# Patient Record
Sex: FEMALE | Race: WHITE | Employment: UNEMPLOYED | URBAN - METROPOLITAN AREA
[De-identification: names, ages, dates, MRNs, and addresses within clinical notes are randomized per-mention and may not be internally consistent; named-entity substitution may affect disease eponyms.]

---

## 2018-04-04 NOTE — PROGRESS NOTES
Ambulatory/Rehab Services H2 Model Falls Risk Assessment    Risk Factor Pts. ·   Confusion/Disorientation/Impulsivity  []    4 ·   Symptomatic Depression  []   2 ·   Altered Elimination  []   1 ·   Dizziness/Vertigo  []   1 ·   Gender (Male)  []   1 ·   Any administered antiepileptics (anticonvulsants):  []   2 ·   Any administered benzodiazepines:  []   1 ·   Visual Impairment (specify):  []   1 ·   Portable Oxygen Use  []   1 ·   Orthostatic ? BP  []   1 ·   History of Recent Falls (within 3 mos.)  []   5     Ability to Rise from Chair (choose one) Pts. ·   Ability to rise in a single movement  [x]   0 ·   Pushes up, successful in one attempt  []   1 ·   Multiple attempts, but successful  []   3 ·   Unable to rise without assistance  []   4   Total: (5 or greater = High Risk) 0     Falls Prevention Plan:   []                Physical Limitations to Exercise (specify):   []                Mobility Assistance Device (type):   []                Exercise/Equipment Adaptation (specify):    ©2010 [facility] [address] Patent #[id number].  Federal Law prohibits the replication, distribution or use without written permission from [facility] [employer]

## 2018-04-04 NOTE — THERAPY EVALUATION
Anthony Lopezco  : 1965  Primary: Corinne Rye Rule/[facility]  Secondary:  [address]  at [place] [id number] [facility], [address] [place] [address],[address], [facility] [address]  [phone]   [phone]          OUTPATIENT PHYSICAL THERAPY:Initial Assessment 2018    ICD-10: Treatment Diagnosis: low back pain M54.5  Precautions/Allergies:   Erythromycin; Kenalog [triamcinolone acetonide]; Lisinopril; and Prednisone   Fall Risk Score: 0 (? 5 = High Risk)  MD Orders: eval and treat MEDICAL/REFERRING DIAGNOSIS:  Chronic left-sided low back pain without sciatica [M54.5, G89.29]   DATE OF ONSET:  with onset in 2017  REFERRING PHYSICIAN: Anni Eisenberg [address] TBD     INITIAL ASSESSMENT:  Ms. Lizabeth Su presents with hypermobile 6th rib, tender and hypertonic subscap, intercostal muscles and thoracic paraspinals, tenderness and step off at costovertebral joint T5, rounded shoulders that limits ability to perform household duties. Pt would benefit from skilled therapy to address these deficits for return to previous level of function. PROBLEM LIST (Impacting functional limitations):  1. Decreased Strength  2. Decreased ADL/Functional Activities  3. Decreased Transfer Abilities  4. Decreased Ambulation Ability/Technique  5. Increased Pain  6. Decreased Flexibility/Joint Mobility  7. Decreased [place] with Home Exercise Program INTERVENTIONS PLANNED:  1. Cold  2. Electrical Stimulation  3. Heat  4. Home Exercise Program (HEP)  5. Manual Therapy  6. Range of Motion (ROM)  7. Therapeutic Exercise/Strengthening   TREATMENT PLAN:  Effective Dates: 2018 TO 2018 (30 days). Frequency/Duration: 2 times a week for 30 Days  GOALS: (Goals have been discussed and agreed upon with patient.)  Short-Term Functional Goals: Time Frame: 2 weeks  1. Pt will be I with HEP to allow for continued progress with symptom management. Discharge Goals: Time Frame: 4 weeks  1.  Pt will improve JULISSA score to 2 to reflect an improvement in function. 2. Pt will improve c/o pain to 2/10 to allow for improved tolerance for housework. 3. Pt will improve middle trap strength to 5-/5. Rehabilitation Potential For Stated Goals: Good  Regarding Timoteo Nice's therapy, I certify that the treatment plan above will be carried out by a therapist or under their direction. Thank you for this referral,  Chirag Alcantar PT     Referring Physician Signature: Aury Jose*              Date                    The information in this section was collected on 04/05/18 (except where otherwise noted). HISTORY:   History of Present Injury/Illness (Reason for Referral):  Pt is a 47 y.o. Female presenting to PT with chronic left sided low back pain. Pain began a couple years ago and was doing a triangle pose in yoga and felt a pop in ribs. Pain began in the front of her ribs and wrapped around to the back. Over time the pain worsened and saw her doctor. Was given exercises and was able to improve the pain. Became sick last year and the pain began again. Cannot twist, lift with left arm, vacuuming, power walking. Has had to avoid painful motions. Xray was unremarkable. Past Medical History/Comorbidities:   Ms. Colonel Alonso  has a past medical history of Arthritis; Endocrine disease; Hashimoto's disease; and White coat hypertension. She also has no past medical history of Gastrointestinal disorder. Ms. Colonel lAonso  has a past surgical history that includes hx appendectomy and hx gyn.   Social History/Living Environment:     unknown  Prior Level of Function/Work/Activity:  Enjoys exercise and yoga  none  Current Medications:       Current Outpatient Prescriptions:     BYSTOLIC 10 mg tablet, TAKE ONE TABLET BY MOUTH EVERY DAY, Disp: 30 Tab, Rfl: 11    clotrimazole-betamethasone (LOTRISONE) topical cream, Apply  to affected area two (2) times a day., Disp: 60 g, Rfl: 0    LORazepam (ATIVAN) 0.5 mg tablet, Take 1 Tab by mouth every four (4) hours as needed for Anxiety. Max Daily Amount: 3 mg., Disp: 45 Tab, Rfl: 0    cholecalciferol, VITAMIN D3, (VITAMIN D3) 5,000 unit tab tablet, Take  by mouth daily. , Disp: , Rfl:     ANASTASIYA THYROID 60 mg tablet, , Disp: , Rfl:    Date Last Reviewed:  4/5/2018    Number of Personal Factors/Comorbidities that affect the Plan of Care: 1-2: MODERATE COMPLEXITY   EXAMINATION:   Observation/Orthostatic Postural Assessment:          Rounded shoulders  Palpation:          hypermobile 6th rib, tender and hypertonic subscap, intercostal muscles and thoracic paraspinals, tenderness and step off at costovertebral joint T5  ROM:            LUMBAR SPINE    AROM    Flexion   Extension  Right Rotation  Left Rotation  Right Sidebend  Left Sidebend 100 %  100 %  70 %  70 %  80 % stretching  80 %   Hip- WNL     Strength:          Middle trap- 4/5     Body Structures Involved:  1. Joints  2. Muscles Body Functions Affected:  1. Sensory/Pain  2. Movement Related Activities and Participation Affected:  1. General Tasks and Demands   Number of elements (examined above) that affect the Plan of Care: 3: MODERATE COMPLEXITY   CLINICAL PRESENTATION:   Presentation: Evolving clinical presentation with changing clinical characteristics: MODERATE COMPLEXITY   CLINICAL DECISION MAKING:   Outcome Measure: Tool Used: Modified Oswestry Low Back Pain Questionnaire  Score:  Initial: 5/50  Most Recent: X/50 (Date: -- )   Interpretation of Score: Each section is scored on a 0-5 scale, 5 representing the greatest disability. The scores of each section are added together for a total score of 50. Score 0 1-10 11-20 21-30 31-40 41-49 50   Modifier CH CI CJ CK CL CM CN     Medical Necessity:   · Patient is expected to demonstrate progress in strength and range of motion to return to previous level of function.   Reason for Services/Other Comments:  · Patient continues to require present interventions due to patient's inability to vacuum. Use of outcome tool(s) and clinical judgement create a POC that gives a: Questionable prediction of patient's progress: MODERATE COMPLEXITY            TREATMENT:   (In addition to Assessment/Re-Assessment sessions the following treatments were rendered)  Pre-treatment Symptoms/Complaints:  Chronic left sided LBP  Pain: Initial:   Pain Intensity 1:  (1-4/10)  Post Session:  4/10     THERAPEUTIC EXERCISE: (14 minutes):  Exercises per grid below to improve mobility and strength. Required moderate visual, verbal and manual cues to promote proper body alignment, promote proper body posture and promote proper body mechanics. Progressed resistance, range, repetitions and complexity of movement as indicated. Date:  04-04-18 Date:   Date:     Activity/Exercise Parameters Parameters Parameters   row 20 reps green      Pull down 20 reps [employer] [facility]  Treatment/Session Assessment:    · Response to Treatment:  Pt would benefit from skilled therapy to address the aforementioned deficits that limit functional ability to return to previous level of function. .  · Compliance with Program/Exercises: Will assess as treatment progresses. · Recommendations/Intent for next treatment session: \"Next visit will focus on advancements to more challenging activities\".   Total Treatment Duration:  PT Patient Time In/Time Out  Time In: 1340  Time Out: 2500 [facility]

## 2018-04-05 ENCOUNTER — HOSPITAL ENCOUNTER (OUTPATIENT)
Dept: PHYSICAL THERAPY | Age: 53
Discharge: HOME | End: 2018-04-05
Attending: FAMILY MEDICINE
Payer: COMMERCIAL

## 2018-04-05 DIAGNOSIS — G89.29 CHRONIC LEFT-SIDED LOW BACK PAIN WITHOUT SCIATICA: ICD-10-CM

## 2018-04-05 DIAGNOSIS — M54.50 CHRONIC LEFT-SIDED LOW BACK PAIN WITHOUT SCIATICA: ICD-10-CM

## 2018-04-05 PROCEDURE — 97110 THERAPEUTIC EXERCISES: CPT

## 2018-04-05 PROCEDURE — 97162 PT EVAL MOD COMPLEX 30 MIN: CPT

## 2018-04-11 ENCOUNTER — HOSPITAL ENCOUNTER (OUTPATIENT)
Dept: PHYSICAL THERAPY | Age: 53
Discharge: HOME | End: 2018-04-11
Attending: FAMILY MEDICINE
Payer: COMMERCIAL

## 2018-04-11 PROCEDURE — 97140 MANUAL THERAPY 1/> REGIONS: CPT

## 2018-04-11 PROCEDURE — 97110 THERAPEUTIC EXERCISES: CPT

## 2018-04-11 NOTE — PROGRESS NOTES
Aris Dodd  : 1965  Primary: Bessy Nogueira/[facility]  Secondary:  [address]  at [facility]  [address], [place] [address]  [phone]   [phone]          OUTPATIENT PHYSICAL THERAPY:Daily Note 2018    ICD-10: Treatment Diagnosis: low back pain M54.5  Precautions/Allergies:   Erythromycin; Kenalog [triamcinolone acetonide]; Lisinopril; and Prednisone   Fall Risk Score: 0 (? 5 = High Risk)  MD Orders: eval and treat MEDICAL/REFERRING DIAGNOSIS:  Low back pain [M54.5]   DATE OF ONSET:  with onset in 2017  REFERRING PHYSICIAN: Anin Eisenberg. [address] TBD     INITIAL ASSESSMENT:  Ms. Sowmya Panda presents with hypermobile 6th rib, tender and hypertonic subscap, intercostal muscles and thoracic paraspinals, tenderness and step off at costovertebral joint T5, rounded shoulders that limits ability to perform household duties. Pt would benefit from skilled therapy to address these deficits for return to previous level of function. PROBLEM LIST (Impacting functional limitations):  1. Decreased Strength  2. Decreased ADL/Functional Activities  3. Decreased Transfer Abilities  4. Decreased Ambulation Ability/Technique  5. Increased Pain  6. Decreased Flexibility/Joint Mobility  7. Decreased [place] with Home Exercise Program INTERVENTIONS PLANNED:  1. Cold  2. Electrical Stimulation  3. Heat  4. Home Exercise Program (HEP)  5. Manual Therapy  6. Range of Motion (ROM)  7. Therapeutic Exercise/Strengthening   TREATMENT PLAN:  Effective Dates: 2018 TO 2018 (30 days). Frequency/Duration: 2 times a week for 30 Days  GOALS: (Goals have been discussed and agreed upon with patient.)  Short-Term Functional Goals: Time Frame: 2 weeks  1. Pt will be I with HEP to allow for continued progress with symptom management. Discharge Goals: Time Frame: 4 weeks  1.  Pt will improve JULISSA score to 2 to reflect an improvement in function. 2. Pt will improve c/o pain to 2/10 to allow for improved tolerance for housework. 3. Pt will improve middle trap strength to 5-/5. Rehabilitation Potential For Stated Goals: Good  Regarding Susan Nice's therapy, I certify that the treatment plan above will be carried out by a therapist or under their direction. Thank you for this referral,  Miroslava Lopez PTA     Referring Physician Signature: Jeremiah Chilel*              Date                    The information in this section was collected on 04/05/18 (except where otherwise noted). HISTORY:   History of Present Injury/Illness (Reason for Referral):  Pt is a 47 y.o. Female presenting to PT with chronic left sided low back pain. Pain began a couple years ago and was doing a triangle pose in yoga and felt a pop in ribs. Pain began in the front of her ribs and wrapped around to the back. Over time the pain worsened and saw her doctor. Was given exercises and was able to improve the pain. Became sick last year and the pain began again. Cannot twist, lift with left arm, vacuuming, power walking. Has had to avoid painful motions. Xray was unremarkable. Past Medical History/Comorbidities:   Ms. Vish Bullock  has a past medical history of Arthritis; Endocrine disease; Hashimoto's disease; and White coat hypertension. She also has no past medical history of Gastrointestinal disorder. Ms. Vish Bullock  has a past surgical history that includes hx appendectomy and hx gyn.   Social History/Living Environment:     unknown  Prior Level of Function/Work/Activity:  Enjoys exercise and yoga  none  Current Medications:       Current Outpatient Prescriptions:     BYSTOLIC 10 mg tablet, TAKE ONE TABLET BY MOUTH EVERY DAY, Disp: 30 Tab, Rfl: 11    clotrimazole-betamethasone (LOTRISONE) topical cream, Apply  to affected area two (2) times a day., Disp: 60 g, Rfl: 0    LORazepam (ATIVAN) 0.5 mg tablet, Take 1 Tab by mouth every four (4) hours as needed for Anxiety. Max Daily Amount: 3 mg., Disp: 45 Tab, Rfl: 0    cholecalciferol, VITAMIN D3, (VITAMIN D3) 5,000 unit tab tablet, Take  by mouth daily. , Disp: , Rfl:     ANASTASIYA THYROID 60 mg tablet, , Disp: , Rfl:    Date Last Reviewed:  4/11/2018    Number of Personal Factors/Comorbidities that affect the Plan of Care: 1-2: MODERATE COMPLEXITY   EXAMINATION:   Observation/Orthostatic Postural Assessment:          Rounded shoulders  Palpation:          hypermobile 6th rib, tender and hypertonic subscap, intercostal muscles and thoracic paraspinals, tenderness and step off at costovertebral joint T5  ROM:            LUMBAR SPINE    AROM    Flexion   Extension  Right Rotation  Left Rotation  Right Sidebend  Left Sidebend 100 %  100 %  70 %  70 %  80 % stretching  80 %   Hip- WNL     Strength:          Middle trap- 4/5     Body Structures Involved:  1. Joints  2. Muscles Body Functions Affected:  1. Sensory/Pain  2. Movement Related Activities and Participation Affected:  1. General Tasks and Demands   Number of elements (examined above) that affect the Plan of Care: 3: MODERATE COMPLEXITY   CLINICAL PRESENTATION:   Presentation: Evolving clinical presentation with changing clinical characteristics: MODERATE COMPLEXITY   CLINICAL DECISION MAKING:   Outcome Measure: Tool Used: Modified Oswestry Low Back Pain Questionnaire  Score:  Initial: 5/50  Most Recent: X/50 (Date: -- )   Interpretation of Score: Each section is scored on a 0-5 scale, 5 representing the greatest disability. The scores of each section are added together for a total score of 50. Score 0 1-10 11-20 21-30 31-40 41-49 50   Modifier CH CI CJ CK CL CM CN     Medical Necessity:   · Patient is expected to demonstrate progress in strength and range of motion to return to previous level of function. Reason for Services/Other Comments:  · Patient continues to require present interventions due to patient's inability to vacuum.    Use of outcome tool(s) and clinical judgement create a POC that gives a: Questionable prediction of patient's progress: MODERATE COMPLEXITY            TREATMENT:   (In addition to Assessment/Re-Assessment sessions the following treatments were rendered)  Pre-treatment Symptoms/Complaints:  Chronic left sided LBP-Rib region  Pain: Initial:     2-10 I picked up a box and brought it to good will. Post Session:  4/10     THERAPEUTIC EXERCISE: (15 minutes):  Exercises per grid below to improve mobility and strength. Required minimal visual, verbal and manual cues to promote proper body alignment, promote proper body posture and promote proper body mechanics. Progressed resistance, range, repetitions and complexity of movement as indicated. MANUAL THERAPY: (30 minutes): Joint mobilization and Soft tissue mobilization was utilized and necessary because of the patient's restricted joint motion, loss of articular motion and restricted motion of soft tissue. MODALITIES: (10 minutes):      *  Cold Pack Therapy in order to provide analgesia. Date:  04-04-18 Date:  4-11-18 Date:     Activity/Exercise Parameters Parameters Parameters   row 20 reps green  20 reps Blue    Pull down 20 reps green 20 reps Blue    UBE  5 min fwd bwds 2.5    Push Ups  15 reps    Lower trunk rotation  X 10 reps pause 5 sec    Bridging  5 sec hold 10 's    Tband D1   Blue x 15 reps each side                   [employer] Portal  Treatment/Session Assessment:    · Response to Treatment:   Patient was able to complete tx well. Tenderness mid back/ribcage/scapular region on Left side. Continue plan of care. · Compliance with Program/Exercises: .  · Recommendations/Intent for next treatment session: \"Next visit will focus on advancements to more challenging activities\".   Total Treatment Duration:  PT Patient Time In/Time Out  Time In: 1400  Time Out: [address], PTA

## 2018-04-13 ENCOUNTER — HOSPITAL ENCOUNTER (OUTPATIENT)
Dept: PHYSICAL THERAPY | Age: 53
Discharge: HOME | End: 2018-04-13
Attending: FAMILY MEDICINE
Payer: COMMERCIAL

## 2018-04-13 PROCEDURE — 97140 MANUAL THERAPY 1/> REGIONS: CPT

## 2018-04-13 PROCEDURE — 97110 THERAPEUTIC EXERCISES: CPT

## 2018-04-13 NOTE — PROGRESS NOTES
Driss Jordan  : 1965  Primary: George Nogueira/[facility]  Secondary:  [address]  at [facility]  [address], [address], [address] [facility]  [phone]   [phone]          OUTPATIENT PHYSICAL THERAPY:Daily Note 2018    ICD-10: Treatment Diagnosis: low back pain M54.5  Precautions/Allergies:   Erythromycin; Kenalog [triamcinolone acetonide]; Lisinopril; and Prednisone   Fall Risk Score: 0 (? 5 = High Risk)  MD Orders: eval and treat MEDICAL/REFERRING DIAGNOSIS:  Low back pain [M54.5]   DATE OF ONSET:  with onset in 2017  REFERRING PHYSICIAN: Anni Eisenberg [address] TBD     INITIAL ASSESSMENT:  Ms. Ting Leong presents with hypermobile 6th rib, tender and hypertonic subscap, intercostal muscles and thoracic paraspinals, tenderness and step off at costovertebral joint T5, rounded shoulders that limits ability to perform household duties. Pt would benefit from skilled therapy to address these deficits for return to previous level of function. PROBLEM LIST (Impacting functional limitations):  1. Decreased Strength  2. Decreased ADL/Functional Activities  3. Decreased Transfer Abilities  4. Decreased Ambulation Ability/Technique  5. Increased Pain  6. Decreased Flexibility/Joint Mobility  7. Decreased [place] with Home Exercise Program INTERVENTIONS PLANNED:  1. Cold  2. Electrical Stimulation  3. Heat  4. Home Exercise Program (HEP)  5. Manual Therapy  6. Range of Motion (ROM)  7. Therapeutic Exercise/Strengthening   TREATMENT PLAN:  Effective Dates: 2018 TO 2018 (30 days). Frequency/Duration: 2 times a week for 30 Days  GOALS: (Goals have been discussed and agreed upon with patient.)  Short-Term Functional Goals: Time Frame: 2 weeks  1. Pt will be I with HEP to allow for continued progress with symptom management. Discharge Goals: Time Frame: 4 weeks  1.  Pt will improve JULISSA score to 2 to reflect an improvement in function. 2. Pt will improve c/o pain to 2/10 to allow for improved tolerance for housework. 3. Pt will improve middle trap strength to 5-/5. Rehabilitation Potential For Stated Goals: Good  Regarding Timoteo Ugartet's therapy, I certify that the treatment plan above will be carried out by a therapist or under their direction. Thank you for this referral,  Chirag Alcantar, PT     Referring Physician Signature: Aury Garcia*              Date                    The information in this section was collected on 04/05/18 (except where otherwise noted). HISTORY:   History of Present Injury/Illness (Reason for Referral):  Pt is a 47 y.o. Female presenting to PT with chronic left sided low back pain. Pain began a couple years ago and was doing a triangle pose in yoga and felt a pop in ribs. Pain began in the front of her ribs and wrapped around to the back. Over time the pain worsened and saw her doctor. Was given exercises and was able to improve the pain. Became sick last year and the pain began again. Cannot twist, lift with left arm, vacuuming, power walking. Has had to avoid painful motions. Xray was unremarkable. Past Medical History/Comorbidities:   Ms. Colonel Alonso  has a past medical history of Arthritis; Endocrine disease; Hashimoto's disease; and White coat hypertension. She also has no past medical history of Gastrointestinal disorder. Ms. Colonel Alonso  has a past surgical history that includes hx appendectomy and hx gyn.   Social History/Living Environment:     unknown  Prior Level of Function/Work/Activity:  Enjoys exercise and yoga  none  Current Medications:       Current Outpatient Prescriptions:     BYSTOLIC 10 mg tablet, TAKE ONE TABLET BY MOUTH EVERY DAY, Disp: 30 Tab, Rfl: 11    clotrimazole-betamethasone (LOTRISONE) topical cream, Apply  to affected area two (2) times a day., Disp: 60 g, Rfl: 0    LORazepam (ATIVAN) 0.5 mg tablet, Take 1 Tab by mouth every four (4) hours as needed for Anxiety. Max Daily Amount: 3 mg., Disp: 45 Tab, Rfl: 0    cholecalciferol, VITAMIN D3, (VITAMIN D3) 5,000 unit tab tablet, Take  by mouth daily. , Disp: , Rfl:     ANASTASIYA THYROID 60 mg tablet, , Disp: , Rfl:    Date Last Reviewed:  4/13/2018    Number of Personal Factors/Comorbidities that affect the Plan of Care: 1-2: MODERATE COMPLEXITY   EXAMINATION:   Observation/Orthostatic Postural Assessment:          Rounded shoulders  Palpation:          hypermobile 6th rib, tender and hypertonic subscap, intercostal muscles and thoracic paraspinals, tenderness and step off at costovertebral joint T5  ROM:            LUMBAR SPINE    AROM    Flexion   Extension  Right Rotation  Left Rotation  Right Sidebend  Left Sidebend 100 %  100 %  70 %  70 %  80 % stretching  80 %   Hip- WNL     Strength:          Middle trap- 4/5     Body Structures Involved:  1. Joints  2. Muscles Body Functions Affected:  1. Sensory/Pain  2. Movement Related Activities and Participation Affected:  1. General Tasks and Demands   Number of elements (examined above) that affect the Plan of Care: 3: MODERATE COMPLEXITY   CLINICAL PRESENTATION:   Presentation: Evolving clinical presentation with changing clinical characteristics: MODERATE COMPLEXITY   CLINICAL DECISION MAKING:   Outcome Measure: Tool Used: Modified Oswestry Low Back Pain Questionnaire  Score:  Initial: 5/50  Most Recent: X/50 (Date: -- )   Interpretation of Score: Each section is scored on a 0-5 scale, 5 representing the greatest disability. The scores of each section are added together for a total score of 50. Score 0 1-10 11-20 21-30 31-40 41-49 50   Modifier CH CI CJ CK CL CM CN     Medical Necessity:   · Patient is expected to demonstrate progress in strength and range of motion to return to previous level of function. Reason for Services/Other Comments:  · Patient continues to require present interventions due to patient's inability to vacuum.    Use of outcome tool(s) and clinical judgement create a POC that gives a: Questionable prediction of patient's progress: MODERATE COMPLEXITY            TREATMENT:   (In addition to Assessment/Re-Assessment sessions the following treatments were rendered)  Pre-treatment Symptoms/Complaints:  Chronic left sided LBP-Rib region- 4/13/2018 Pt reports that the intensity and frequency of her pain has improved. Pain: Initial:    4/10 Post Session:  4/10     THERAPEUTIC EXERCISE: (15 minutes):  Exercises per grid below to improve mobility and strength. Required minimal visual, verbal and manual cues to promote proper body alignment, promote proper body posture and promote proper body mechanics. Progressed resistance, range, repetitions and complexity of movement as indicated. MANUAL THERAPY: (30 minutes): Joint mobilization and Soft tissue mobilization was utilized and necessary because of the patient's restricted joint motion, loss of articular motion and restricted motion of soft tissue. MODALITIES: (10 minutes):      *  Cold Pack Therapy in order to provide analgesia. Date:  04-04-18 Date:  4-11-18 Date:  04-13-18   Activity/Exercise Parameters Parameters Parameters   row 20 reps green  20 reps Blue    Pull down 20 reps green 20 reps Blue    UBE  5 min fwd bwds 2.5 5 min fwd level 3.0   Push Ups  15 reps On wall x 20 reps   Lower trunk rotation  X 10 reps pause 5 sec 20 reps   Bridging  5 sec hold 10 's 20 reps   Tband D1   Blue x 15 reps each side Blue x 20 reps each   Horizontal ABD   Red x 20 reps            Med[facility] Portal  Treatment/Session Assessment:    · Response to Treatment:   Patient is progressing well with lumbar and thoracic stability. · Compliance with Program/Exercises: .  · Recommendations/Intent for next treatment session: \"Next visit will focus on advancements to more challenging activities\".   Total Treatment Duration:  PT Patient Time In/Time Out  Time In: 1033  Time Out: [address]

## 2018-04-17 ENCOUNTER — HOSPITAL ENCOUNTER (OUTPATIENT)
Dept: PHYSICAL THERAPY | Age: 53
Discharge: HOME | End: 2018-04-17
Attending: FAMILY MEDICINE
Payer: COMMERCIAL

## 2018-04-17 PROCEDURE — 97110 THERAPEUTIC EXERCISES: CPT

## 2018-04-17 PROCEDURE — 97140 MANUAL THERAPY 1/> REGIONS: CPT

## 2018-04-17 NOTE — PROGRESS NOTES
Lewis Mireya  : 1965  Primary: Angelkathya Nogueira/[facility]  Secondary:  [address] at [place] [id number] [facility], [address] [place] [address],[address], [address]  [phone]   [phone]          OUTPATIENT PHYSICAL THERAPY:Daily Note 2018    ICD-10: Treatment Diagnosis: low back pain M54.5  Precautions/Allergies:   Erythromycin; Kenalog [triamcinolone acetonide]; Lisinopril; and Prednisone   Fall Risk Score: 0 (? 5 = High Risk)  MD Orders: eval and treat MEDICAL/REFERRING DIAGNOSIS:  Low back pain [M54.5]   DATE OF ONSET:  with onset in 2017  REFERRING PHYSICIAN: Anni Eisenberg. [address] TBD     INITIAL ASSESSMENT:  Ms. Falcon  presents with hypermobile 6th rib, tender and hypertonic subscap, intercostal muscles and thoracic paraspinals, tenderness and step off at costovertebral joint T5, rounded shoulders that limits ability to perform household duties. Pt would benefit from skilled therapy to address these deficits for return to previous level of function. PROBLEM LIST (Impacting functional limitations):  1. Decreased Strength  2. Decreased ADL/Functional Activities  3. Decreased Transfer Abilities  4. Decreased Ambulation Ability/Technique  5. Increased Pain  6. Decreased Flexibility/Joint Mobility  7. Decreased [place] with Home Exercise Program INTERVENTIONS PLANNED:  1. Cold  2. Electrical Stimulation  3. Heat  4. Home Exercise Program (HEP)  5. Manual Therapy  6. Range of Motion (ROM)  7. Therapeutic Exercise/Strengthening   TREATMENT PLAN:  Effective Dates: 2018 TO 2018 (30 days). Frequency/Duration: 2 times a week for 30 Days  GOALS: (Goals have been discussed and agreed upon with patient.)  Short-Term Functional Goals: Time Frame: 2 weeks  1. Pt will be I with HEP to allow for continued progress with symptom management. Discharge Goals: Time Frame: 4 weeks  1.  Pt will improve JULISSA score to 2 to reflect an improvement in function. 2. Pt will improve c/o pain to 2/10 to allow for improved tolerance for housework. 3. Pt will improve middle trap strength to 5-/5. Rehabilitation Potential For Stated Goals: Good  Regarding Maurizio Niec's therapy, I certify that the treatment plan above will be carried out by a therapist or under their direction. Thank you for this referral,  Tino Knutson PTA     Referring Physician Signature: Paige Brian*              Date                    The information in this section was collected on 04/05/18 (except where otherwise noted). HISTORY:   History of Present Injury/Illness (Reason for Referral):  Pt is a 47 y.o. Female presenting to PT with chronic left sided low back pain. Pain began a couple years ago and was doing a triangle pose in yoga and felt a pop in ribs. Pain began in the front of her ribs and wrapped around to the back. Over time the pain worsened and saw her doctor. Was given exercises and was able to improve the pain. Became sick last year and the pain began again. Cannot twist, lift with left arm, vacuuming, power walking. Has had to avoid painful motions. Xray was unremarkable. Past Medical History/Comorbidities:   Ms. Shawna Castaneda  has a past medical history of Arthritis; Endocrine disease; Hashimoto's disease; and White coat hypertension. She also has no past medical history of Gastrointestinal disorder. Ms. Shawna Castaneda  has a past surgical history that includes hx appendectomy and hx gyn.   Social History/Living Environment:     unknown  Prior Level of Function/Work/Activity:  Enjoys exercise and yoga  none  Current Medications:       Current Outpatient Prescriptions:     BYSTOLIC 10 mg tablet, TAKE ONE TABLET BY MOUTH EVERY DAY, Disp: 30 Tab, Rfl: 11    clotrimazole-betamethasone (LOTRISONE) topical cream, Apply  to affected area two (2) times a day., Disp: 60 g, Rfl: 0    LORazepam (ATIVAN) 0.5 mg tablet, Take 1 Tab by mouth every four (4) hours as needed for Anxiety. Max Daily Amount: 3 mg., Disp: 45 Tab, Rfl: 0    cholecalciferol, VITAMIN D3, (VITAMIN D3) 5,000 unit tab tablet, Take  by mouth daily. , Disp: , Rfl:     ANASTASIYA THYROID 60 mg tablet, , Disp: , Rfl:    Date Last Reviewed:  4/17/2018    Number of Personal Factors/Comorbidities that affect the Plan of Care: 1-2: MODERATE COMPLEXITY   EXAMINATION:   Observation/Orthostatic Postural Assessment:          Rounded shoulders  Palpation:          hypermobile 6th rib, tender and hypertonic subscap, intercostal muscles and thoracic paraspinals, tenderness and step off at costovertebral joint T5  ROM:            LUMBAR SPINE    AROM    Flexion   Extension  Right Rotation  Left Rotation  Right Sidebend  Left Sidebend 100 %  100 %  70 %  70 %  80 % stretching  80 %   Hip- WNL     Strength:          Middle trap- 4/5     Body Structures Involved:  1. Joints  2. Muscles Body Functions Affected:  1. Sensory/Pain  2. Movement Related Activities and Participation Affected:  1. General Tasks and Demands   Number of elements (examined above) that affect the Plan of Care: 3: MODERATE COMPLEXITY   CLINICAL PRESENTATION:   Presentation: Evolving clinical presentation with changing clinical characteristics: MODERATE COMPLEXITY   CLINICAL DECISION MAKING:   Outcome Measure: Tool Used: Modified Oswestry Low Back Pain Questionnaire  Score:  Initial: 5/50  Most Recent: X/50 (Date: -- )   Interpretation of Score: Each section is scored on a 0-5 scale, 5 representing the greatest disability. The scores of each section are added together for a total score of 50. Score 0 1-10 11-20 21-30 31-40 41-49 50   Modifier CH CI CJ CK CL CM CN     Medical Necessity:   · Patient is expected to demonstrate progress in strength and range of motion to return to previous level of function. Reason for Services/Other Comments:  · Patient continues to require present interventions due to patient's inability to vacuum.    Use of outcome tool(s) and clinical judgement create a POC that gives a: Questionable prediction of patient's progress: MODERATE COMPLEXITY            TREATMENT:   (In addition to Assessment/Re-Assessment sessions the following treatments were rendered)  Pre-treatment Symptoms/Complaints:  4/17/2018 \"it is getting some better\"  Pain: Initial:     1-2/10 Post Session:  1/10     THERAPEUTIC EXERCISE: (15 minutes):  Exercises per grid below to improve mobility and strength. Required minimal visual, verbal and manual cues to promote proper body alignment, promote proper body posture and promote proper body mechanics. Progressed resistance, range, repetitions and complexity of movement as indicated. MANUAL THERAPY: (30 minutes): Joint mobilization and Soft tissue mobilization was utilized and necessary because of the patient's restricted joint motion, loss of articular motion and restricted motion of soft tissue. MODALITIES: (10 minutes):      *  Cold Pack Therapy in order to provide analgesia. Date:  4-11-18 Date:  04-16-18   Activity/Exercise Parameters Parameters   row 20 reps Blue 20 reps Blue   Pull down 20 reps Blue 20 reps Blue   UBE 5 min fwd bwds 2.5 5 min fwd level 3.0   Push Ups 15 reps On wall x 20 reps   Lower trunk rotation X 10 reps pause 5 sec 20 reps   Bridging 5 sec hold 10 's 20 reps   Tband D1  Blue x 15 reps each side Blue x 20 reps each   Horizontal ABD  Red x 20 reps           MedBridge Portal  Treatment/Session Assessment:    · Response to Treatment:   Mid Scapular tightness, wraps around to ribcage on the Left side. More irritated today then normal, did yard work over the weekend. Continue plan of care. · Compliance with Program/Exercises: .  · Recommendations/Intent for next treatment session: \"Next visit will focus on advancements to more challenging activities\".   Total Treatment Duration:  PT Patient Time In/Time Out  Time In: 1115  Time Out: [address], [facility]

## 2018-04-19 ENCOUNTER — HOSPITAL ENCOUNTER (OUTPATIENT)
Dept: PHYSICAL THERAPY | Age: 53
Discharge: HOME | End: 2018-04-19
Attending: FAMILY MEDICINE
Payer: COMMERCIAL

## 2018-04-19 PROCEDURE — 97014 ELECTRIC STIMULATION THERAPY: CPT

## 2018-04-19 PROCEDURE — 97110 THERAPEUTIC EXERCISES: CPT

## 2018-04-19 PROCEDURE — 97140 MANUAL THERAPY 1/> REGIONS: CPT

## 2018-04-19 NOTE — PROGRESS NOTES
Kris Hall  : 1965  Primary: Azra Nogueira/[facility]  Secondary:  [address] at [place] [address] [facility], [address] [place] [address],[address], [address]  [phone]   [phone]          OUTPATIENT PHYSICAL THERAPY:Daily Note 2018    ICD-10: Treatment Diagnosis: low back pain M54.5  Precautions/Allergies:   Erythromycin; Kenalog [triamcinolone acetonide]; Lisinopril; and Prednisone   Fall Risk Score: 0 (? 5 = High Risk)  MD Orders: eval and treat MEDICAL/REFERRING DIAGNOSIS:  Low back pain [M54.5]   DATE OF ONSET:  with onset in 2017  REFERRING PHYSICIAN: Anni Eisenberg. [address] TBD     INITIAL ASSESSMENT:  Ms. Talya Reveles presents with hypermobile 6th rib, tender and hypertonic subscap, intercostal muscles and thoracic paraspinals, tenderness and step off at costovertebral joint T5, rounded shoulders that limits ability to perform household duties. Pt would benefit from skilled therapy to address these deficits for return to previous level of function. PROBLEM LIST (Impacting functional limitations):  1. Decreased Strength  2. Decreased ADL/Functional Activities  3. Decreased Transfer Abilities  4. Decreased Ambulation Ability/Technique  5. Increased Pain  6. Decreased Flexibility/Joint Mobility  7. Decreased [place] with Home Exercise Program INTERVENTIONS PLANNED:  1. Cold  2. Electrical Stimulation  3. Heat  4. Home Exercise Program (HEP)  5. Manual Therapy  6. Range of Motion (ROM)  7. Therapeutic Exercise/Strengthening   TREATMENT PLAN:  Effective Dates: 2018 TO 2018 (30 days). Frequency/Duration: 2 times a week for 30 Days  GOALS: (Goals have been discussed and agreed upon with patient.)  Short-Term Functional Goals: Time Frame: 2 weeks  1. Pt will be I with HEP to allow for continued progress with symptom management. Discharge Goals: Time Frame: 4 weeks  1.  Pt will improve JULISSA score to 2 to reflect an improvement in function. 2. Pt will improve c/o pain to 2/10 to allow for improved tolerance for housework. 3. Pt will improve middle trap strength to 5-/5. Rehabilitation Potential For Stated Goals: Good  Regarding Cindy Nice's therapy, I certify that the treatment plan above will be carried out by a therapist or under their direction. Thank you for this referral,  Chino Ca PTA     Referring Physician Signature: Genaro Castillo*              Date                    The information in this section was collected on 04/05/18 (except where otherwise noted). HISTORY:   History of Present Injury/Illness (Reason for Referral):  Pt is a 47 y.o. Female presenting to PT with chronic left sided low back pain. Pain began a couple years ago and was doing a triangle pose in yoga and felt a pop in ribs. Pain began in the front of her ribs and wrapped around to the back. Over time the pain worsened and saw her doctor. Was given exercises and was able to improve the pain. Became sick last year and the pain began again. Cannot twist, lift with left arm, vacuuming, power walking. Has had to avoid painful motions. Xray was unremarkable. Past Medical History/Comorbidities:   Ms. Melvin Sarkar  has a past medical history of Arthritis; Endocrine disease; Hashimoto's disease; and White coat hypertension. She also has no past medical history of Gastrointestinal disorder. Ms. Melvin Sarkar  has a past surgical history that includes hx appendectomy and hx gyn.   Social History/Living Environment:     unknown  Prior Level of Function/Work/Activity:  Enjoys exercise and yoga  none  Current Medications:       Current Outpatient Prescriptions:     BYSTOLIC 10 mg tablet, TAKE ONE TABLET BY MOUTH EVERY DAY, Disp: 30 Tab, Rfl: 11    clotrimazole-betamethasone (LOTRISONE) topical cream, Apply  to affected area two (2) times a day., Disp: 60 g, Rfl: 0    LORazepam (ATIVAN) 0.5 mg tablet, Take 1 Tab by mouth every four (4) hours as needed for Anxiety. Max Daily Amount: 3 mg., Disp: 45 Tab, Rfl: 0    cholecalciferol, VITAMIN D3, (VITAMIN D3) 5,000 unit tab tablet, Take  by mouth daily. , Disp: , Rfl:     ANASTASIYA THYROID 60 mg tablet, , Disp: , Rfl:    Date Last Reviewed:  4/19/2018    Number of Personal Factors/Comorbidities that affect the Plan of Care: 1-2: MODERATE COMPLEXITY   EXAMINATION:   Observation/Orthostatic Postural Assessment:          Rounded shoulders  Palpation:          hypermobile 6th rib, tender and hypertonic subscap, intercostal muscles and thoracic paraspinals, tenderness and step off at costovertebral joint T5  ROM:            LUMBAR SPINE    AROM    Flexion   Extension  Right Rotation  Left Rotation  Right Sidebend  Left Sidebend 100 %  100 %  70 %  70 %  80 % stretching  80 %   Hip- WNL     Strength:          Middle trap- 4/5     Body Structures Involved:  1. Joints  2. Muscles Body Functions Affected:  1. Sensory/Pain  2. Movement Related Activities and Participation Affected:  1. General Tasks and Demands   Number of elements (examined above) that affect the Plan of Care: 3: MODERATE COMPLEXITY   CLINICAL PRESENTATION:   Presentation: Evolving clinical presentation with changing clinical characteristics: MODERATE COMPLEXITY   CLINICAL DECISION MAKING:   Outcome Measure: Tool Used: Modified Oswestry Low Back Pain Questionnaire  Score:  Initial: 5/50  Most Recent: X/50 (Date: -- )   Interpretation of Score: Each section is scored on a 0-5 scale, 5 representing the greatest disability. The scores of each section are added together for a total score of 50. Score 0 1-10 11-20 21-30 31-40 41-49 50   Modifier CH CI CJ CK CL CM CN     Medical Necessity:   · Patient is expected to demonstrate progress in strength and range of motion to return to previous level of function. Reason for Services/Other Comments:  · Patient continues to require present interventions due to patient's inability to vacuum.    Use of outcome tool(s) and clinical judgement create a POC that gives a: Questionable prediction of patient's progress: MODERATE COMPLEXITY            TREATMENT:   (In addition to Assessment/Re-Assessment sessions the following treatments were rendered)  Pre-treatment Symptoms/Complaints:  4/19/2018 \"It is really hurting today, I think it is that one exercises I am doing at home\"  Pain: Initial:     3-5/10 Post Session:  2/10     THERAPEUTIC EXERCISE: (15 minutes):  Exercises per grid below to improve mobility and strength. Required minimal visual, verbal and manual cues to promote proper body alignment, promote proper body posture and promote proper body mechanics. Progressed resistance, range, repetitions and complexity of movement as indicated. MANUAL THERAPY: (15 minutes): Joint mobilization and Soft tissue mobilization was utilized and necessary because of the patient's restricted joint motion, loss of articular motion and restricted motion of soft tissue. MODALITIES: (15 minutes): *  Electrical Stimulation Therapy (15) was provided with intensity adjusted throughout treatment to patient tolerance. mid back Left side       *  Cold Pack Therapy in order to provide analgesia. Date:  4-11-18 Date:  04-19-18   Activity/Exercise Parameters Parameters   row 20 reps Blue 20 reps Blue   Pull down 20 reps Blue 20 reps Blue   UBE 5 min fwd bwds 2.5 5 min fwd level 3.0   Push Ups 15 reps On wall x 20 reps   Lower trunk rotation X 10 reps pause 5 sec 20 reps   Bridging 5 sec hold 10 's 20 reps   Tband D1  Blue x 15 reps each side Blue x 20 reps each   Horizontal ABD  Causes pain-           [facility] Portal  Treatment/Session Assessment:    · Response to Treatment:   Patient has been seen for 5 visits with minimal improvement. Still having symptoms left side Mid back that wraps around to rib cage. Increased pain today-  · Compliance with Program/Exercises: .  · Recommendations/Intent for next treatment session:  \"Next visit will focus on advancements to more challenging activities\".   Total Treatment Duration:  PT Patient Time In/Time Out  Time In: 1345  Time Out: [address], PTA

## 2018-04-26 ENCOUNTER — HOSPITAL ENCOUNTER (OUTPATIENT)
Dept: PHYSICAL THERAPY | Age: 53
Discharge: HOME | End: 2018-04-26
Attending: FAMILY MEDICINE
Payer: COMMERCIAL

## 2018-04-26 PROCEDURE — 97140 MANUAL THERAPY 1/> REGIONS: CPT

## 2018-04-26 PROCEDURE — 97014 ELECTRIC STIMULATION THERAPY: CPT

## 2018-04-26 NOTE — PROGRESS NOTES
Kevin Bragg  : 1965  Primary: Maninder Chadwick Jero/[facility]  Secondary:  [address] at [place] [address] [facility], [address] [place] [address],[address], [facility] [address]  [phone]   [phone]          OUTPATIENT PHYSICAL THERAPY:Daily Note 2018    ICD-10: Treatment Diagnosis: low back pain M54.5  Precautions/Allergies:   Erythromycin; Kenalog [triamcinolone acetonide]; Lisinopril; and Prednisone   Fall Risk Score: 0 (? 5 = High Risk)  MD Orders: eval and treat MEDICAL/REFERRING DIAGNOSIS:  Low back pain [M54.5]   DATE OF ONSET:  with onset in 2017  REFERRING PHYSICIAN: Anni Eisenberg. [address] TBD     INITIAL ASSESSMENT:  Ms. Tia Wilson presents with hypermobile 6th rib, tender and hypertonic subscap, intercostal muscles and thoracic paraspinals, tenderness and step off at costovertebral joint T5, rounded shoulders that limits ability to perform household duties. Pt would benefit from skilled therapy to address these deficits for return to previous level of function. PROBLEM LIST (Impacting functional limitations):  1. Decreased Strength  2. Decreased ADL/Functional Activities  3. Decreased Transfer Abilities  4. Decreased Ambulation Ability/Technique  5. Increased Pain  6. Decreased Flexibility/Joint Mobility  7. Decreased [place] with Home Exercise Program INTERVENTIONS PLANNED:  1. Cold  2. Electrical Stimulation  3. Heat  4. Home Exercise Program (HEP)  5. Manual Therapy  6. Range of Motion (ROM)  7. Therapeutic Exercise/Strengthening   TREATMENT PLAN:  Effective Dates: 2018 TO 2018 (30 days). Frequency/Duration: 2 times a week for 30 Days  GOALS: (Goals have been discussed and agreed upon with patient.)  Short-Term Functional Goals: Time Frame: 2 weeks  1. Pt will be I with HEP to allow for continued progress with symptom management. Discharge Goals: Time Frame: 4 weeks  1.  Pt will improve JULISSA score to 2 to reflect an improvement in function. 2. Pt will improve c/o pain to 2/10 to allow for improved tolerance for housework. 3. Pt will improve middle trap strength to 5-/5. Rehabilitation Potential For Stated Goals: Good  Regarding Timoteo Ugartet's therapy, I certify that the treatment plan above will be carried out by a therapist or under their direction. Thank you for this referral,  Bettyjane Sicard, PTA     Referring Physician Signature: Aury Garcia*              Date                    The information in this section was collected on 04/05/18 (except where otherwise noted). HISTORY:   History of Present Injury/Illness (Reason for Referral):  Pt is a 47 y.o. Female presenting to PT with chronic left sided low back pain. Pain began a couple years ago and was doing a triangle pose in yoga and felt a pop in ribs. Pain began in the front of her ribs and wrapped around to the back. Over time the pain worsened and saw her doctor. Was given exercises and was able to improve the pain. Became sick last year and the pain began again. Cannot twist, lift with left arm, vacuuming, power walking. Has had to avoid painful motions. Xray was unremarkable. Past Medical History/Comorbidities:   Ms. Colonel Alonso  has a past medical history of Arthritis; Endocrine disease; Hashimoto's disease; and White coat hypertension. She also has no past medical history of Gastrointestinal disorder. Ms. Colonel Alonso  has a past surgical history that includes hx appendectomy and hx gyn.   Social History/Living Environment:     unknown  Prior Level of Function/Work/Activity:  Enjoys exercise and yoga  none  Current Medications:       Current Outpatient Prescriptions:     BYSTOLIC 10 mg tablet, TAKE ONE TABLET BY MOUTH EVERY DAY, Disp: 30 Tab, Rfl: 11    clotrimazole-betamethasone (LOTRISONE) topical cream, Apply  to affected area two (2) times a day., Disp: 60 g, Rfl: 0    LORazepam (ATIVAN) 0.5 mg tablet, Take 1 Tab by mouth every four (4) hours as needed for Anxiety. Max Daily Amount: 3 mg., Disp: 45 Tab, Rfl: 0    cholecalciferol, VITAMIN D3, (VITAMIN D3) 5,000 unit tab tablet, Take  by mouth daily. , Disp: , Rfl:     ANASTASIYA THYROID 60 mg tablet, , Disp: , Rfl:    Date Last Reviewed:  4/26/2018    Number of Personal Factors/Comorbidities that affect the Plan of Care: 1-2: MODERATE COMPLEXITY   EXAMINATION:   Observation/Orthostatic Postural Assessment:          Rounded shoulders  Palpation:          hypermobile 6th rib, tender and hypertonic subscap, intercostal muscles and thoracic paraspinals, tenderness and step off at costovertebral joint T5  ROM:            LUMBAR SPINE    AROM    Flexion   Extension  Right Rotation  Left Rotation  Right Sidebend  Left Sidebend 100 %  100 %  70 %  70 %  80 % stretching  80 %   Hip- WNL     Strength:          Middle trap- 4/5     Body Structures Involved:  1. Joints  2. Muscles Body Functions Affected:  1. Sensory/Pain  2. Movement Related Activities and Participation Affected:  1. General Tasks and Demands   Number of elements (examined above) that affect the Plan of Care: 3: MODERATE COMPLEXITY   CLINICAL PRESENTATION:   Presentation: Evolving clinical presentation with changing clinical characteristics: MODERATE COMPLEXITY   CLINICAL DECISION MAKING:   Outcome Measure: Tool Used: Modified Oswestry Low Back Pain Questionnaire  Score:  Initial: 5/50  Most Recent: X/50 (Date: -- )   Interpretation of Score: Each section is scored on a 0-5 scale, 5 representing the greatest disability. The scores of each section are added together for a total score of 50. Score 0 1-10 11-20 21-30 31-40 41-49 50   Modifier CH CI CJ CK CL CM CN     Medical Necessity:   · Patient is expected to demonstrate progress in strength and range of motion to return to previous level of function. Reason for Services/Other Comments:  · Patient continues to require present interventions due to patient's inability to vacuum.    Use of outcome tool(s) and clinical judgement create a POC that gives a: Questionable prediction of patient's progress: MODERATE COMPLEXITY            TREATMENT:   (In addition to Assessment/Re-Assessment sessions the following treatments were rendered)  Pre-treatment Symptoms/Complaints:  4/26/2018  It is pretty good  Pain: Initial:      Post Session:  2/10     MANUAL THERAPY: (30 minutes): Joint mobilization and Soft tissue mobilization was utilized and necessary because of the patient's restricted joint motion, loss of articular motion and restricted motion of soft tissue. MODALITIES: (15 minutes): *  Electrical Stimulation Therapy (15) was provided with intensity adjusted throughout treatment to patient tolerance. mid back Left side       *  Cold Pack Therapy in order to provide analgesia. Date:  4-11-18 Date:  04-19-18 Date:  04-26-18   Activity/Exercise Parameters Parameters    row 20 reps Blue 20 reps Blue Blue x 20 reps   Pull down 20 reps Blue 20 reps Blue Blue x 20 reps   UBE 5 min fwd bwds 2.5 5 min fwd level 3.0 5 min fwd level 3.0   Push Ups 15 reps On wall x 20 reps On wall x 20 reps   Lower trunk rotation X 10 reps pause 5 sec 20 reps    Bridging 5 sec hold 10 's 20 reps 20 reps   Tband D1  Blue x 15 reps each side Blue x 20 reps each    Horizontal ABD  Causes pain- D/C   Doorway pec stretch   3x30 sec hold      [employer]  Treatment/Session Assessment:    · Response to Treatment:  Felt better after manual today. Continue plan of care. ·   · Compliance with Program/Exercises: .  · Recommendations/Intent for next treatment session: \"Next visit will focus on advancements to more challenging activities\".   Total Treatment Duration:  PT Patient Time In/Time Out  Time In: 1115  Time Out: [facility]

## 2018-05-01 ENCOUNTER — HOSPITAL ENCOUNTER (OUTPATIENT)
Dept: PHYSICAL THERAPY | Age: 53
Discharge: HOME | End: 2018-05-01
Attending: FAMILY MEDICINE

## 2018-05-01 NOTE — PROGRESS NOTES
Jordyn Hickey  : 1965  Primary: Ryan Nogueira/[facility]  Secondary:  [facility] [place] [id number] [place] [address], [place] [address].  [phone]   [phone]       Pt unable to attend scheduled appointment.     Shreyas Dawn, PT, DPT

## 2018-05-31 ENCOUNTER — HOSPITAL ENCOUNTER (OUTPATIENT)
Dept: MAMMOGRAPHY | Age: 53
Discharge: HOME | End: 2018-05-31
Attending: FAMILY MEDICINE
Payer: COMMERCIAL

## 2018-05-31 DIAGNOSIS — Z12.31 VISIT FOR SCREENING MAMMOGRAM: ICD-10-CM

## 2018-05-31 PROCEDURE — 77067 SCR MAMMO BI INCL CAD: CPT

## 2018-12-19 PROBLEM — E66.01 OBESITY, MORBID (HCC): Status: ACTIVE | Noted: 2018-12-19
